# Patient Record
Sex: MALE | Race: WHITE | NOT HISPANIC OR LATINO | Employment: FULL TIME | URBAN - METROPOLITAN AREA
[De-identification: names, ages, dates, MRNs, and addresses within clinical notes are randomized per-mention and may not be internally consistent; named-entity substitution may affect disease eponyms.]

---

## 2020-01-10 ENCOUNTER — OFFICE VISIT (OUTPATIENT)
Dept: FAMILY MEDICINE CLINIC | Facility: CLINIC | Age: 40
End: 2020-01-10

## 2020-01-10 VITALS
BODY MASS INDEX: 37.78 KG/M2 | TEMPERATURE: 98.9 F | WEIGHT: 249.25 LBS | HEART RATE: 70 BPM | OXYGEN SATURATION: 98 % | SYSTOLIC BLOOD PRESSURE: 118 MMHG | HEIGHT: 68 IN | DIASTOLIC BLOOD PRESSURE: 80 MMHG

## 2020-01-10 DIAGNOSIS — K21.9 GASTROESOPHAGEAL REFLUX DISEASE WITHOUT ESOPHAGITIS: Primary | ICD-10-CM

## 2020-01-10 DIAGNOSIS — Z00.00 HEALTHCARE MAINTENANCE: ICD-10-CM

## 2020-01-10 PROCEDURE — 99213 OFFICE O/P EST LOW 20 MIN: CPT | Performed by: FAMILY MEDICINE

## 2020-01-10 RX ORDER — OMEPRAZOLE 20 MG/1
20 CAPSULE, DELAYED RELEASE ORAL DAILY
COMMUNITY
End: 2020-01-10 | Stop reason: SDUPTHER

## 2020-01-10 RX ORDER — OMEPRAZOLE 20 MG/1
20 CAPSULE, DELAYED RELEASE ORAL DAILY
Qty: 90 CAPSULE | Refills: 3 | Status: SHIPPED | OUTPATIENT
Start: 2020-01-10 | End: 2021-02-11 | Stop reason: SDUPTHER

## 2020-01-10 NOTE — PROGRESS NOTES
Subjective   Jose Rich is a 39 y.o. male.     Chief Complaint   Patient presents with   • GI Problem   • Annual Exam       Heartburn   This is a chronic problem. The current episode started more than 1 year ago. The problem occurs constantly. The problem has been unchanged. The symptoms are aggravated by caffeine and certain foods. There are no known risk factors. He has tried a PPI for the symptoms. The treatment provided significant relief.          The following portions of the patient's history were reviewed and updated as appropriate: allergies, current medications, past family history, past medical history, past social history, past surgical history and problem list.    Past Medical History:   Diagnosis Date   • Abdominal pain, LLQ (left lower quadrant)    • Blood in stool    • Change in bowel habits    • Colon cancer screening    • Esophagitis, reflux    • Seasonal allergies        No past surgical history on file.    Family History   Family history unknown: Yes       Social History     Socioeconomic History   • Marital status:      Spouse name: Not on file   • Number of children: Not on file   • Years of education: Not on file   • Highest education level: Not on file   Tobacco Use   • Smoking status: Never Smoker   • Smokeless tobacco: Never Used   Substance and Sexual Activity   • Alcohol use: Never     Frequency: Never   • Drug use: Never   • Sexual activity: Yes       Current Outpatient Medications on File Prior to Visit   Medication Sig Dispense Refill   • [DISCONTINUED] omeprazole (priLOSEC) 20 MG capsule Take 20 mg by mouth Daily.       No current facility-administered medications on file prior to visit.        Review of Systems   All other systems reviewed and are negative.      No results found for this or any previous visit (from the past 4704 hour(s)).  Objective   Vitals:    01/10/20 0733   BP: 118/80   Pulse: 70   Temp: 98.9 °F (37.2 °C)   SpO2: 98%   Weight: 113 kg (249 lb 4 oz)  "  Height: 172.7 cm (68\")     Body mass index is 37.9 kg/m².  Physical Exam   Constitutional: He appears well-developed and well-nourished. No distress.   Cardiovascular: Normal rate and regular rhythm.   Pulmonary/Chest: Effort normal and breath sounds normal. No respiratory distress. He has no wheezes.   Skin: He is not diaphoretic.   Nursing note and vitals reviewed.        Assessment/Plan   Jose was seen today for gi problem and annual exam.    Diagnoses and all orders for this visit:    Gastroesophageal reflux disease without esophagitis  -     omeprazole (priLOSEC) 20 MG capsule; Take 1 capsule by mouth Daily.    Healthcare maintenance  -     Comprehensive Metabolic Panel  -     Lipid Panel  -     CBC & Differential      Return if symptoms worsen or fail to improve.           "

## 2020-01-12 LAB
ALBUMIN SERPL-MCNC: 4.7 G/DL (ref 3.5–5.5)
ALBUMIN/GLOB SERPL: 1.9 {RATIO} (ref 1.2–2.2)
ALP SERPL-CCNC: 67 IU/L (ref 39–117)
ALT SERPL-CCNC: 23 IU/L (ref 0–44)
AST SERPL-CCNC: 19 IU/L (ref 0–40)
BASOPHILS # BLD AUTO: 0 X10E3/UL (ref 0–0.2)
BASOPHILS NFR BLD AUTO: 0 %
BILIRUB SERPL-MCNC: 0.5 MG/DL (ref 0–1.2)
BUN SERPL-MCNC: 17 MG/DL (ref 6–20)
BUN/CREAT SERPL: 17 (ref 9–20)
CALCIUM SERPL-MCNC: 9.4 MG/DL (ref 8.7–10.2)
CHLORIDE SERPL-SCNC: 102 MMOL/L (ref 96–106)
CHOLEST SERPL-MCNC: 188 MG/DL (ref 100–199)
CO2 SERPL-SCNC: 20 MMOL/L (ref 20–29)
CREAT SERPL-MCNC: 1 MG/DL (ref 0.76–1.27)
EOSINOPHIL # BLD AUTO: 0.1 X10E3/UL (ref 0–0.4)
EOSINOPHIL NFR BLD AUTO: 1 %
ERYTHROCYTE [DISTWIDTH] IN BLOOD BY AUTOMATED COUNT: 13.5 % (ref 11.6–15.4)
GLOBULIN SER CALC-MCNC: 2.5 G/DL (ref 1.5–4.5)
GLUCOSE SERPL-MCNC: 96 MG/DL (ref 65–99)
HCT VFR BLD AUTO: 48 % (ref 37.5–51)
HDLC SERPL-MCNC: 45 MG/DL
HGB BLD-MCNC: 16.5 G/DL (ref 13–17.7)
IMM GRANULOCYTES # BLD AUTO: 0 X10E3/UL (ref 0–0.1)
IMM GRANULOCYTES NFR BLD AUTO: 0 %
LDLC SERPL CALC-MCNC: 125 MG/DL (ref 0–99)
LYMPHOCYTES # BLD AUTO: 1.8 X10E3/UL (ref 0.7–3.1)
LYMPHOCYTES NFR BLD AUTO: 25 %
MCH RBC QN AUTO: 30.6 PG (ref 26.6–33)
MCHC RBC AUTO-ENTMCNC: 34.4 G/DL (ref 31.5–35.7)
MCV RBC AUTO: 89 FL (ref 79–97)
MONOCYTES # BLD AUTO: 0.5 X10E3/UL (ref 0.1–0.9)
MONOCYTES NFR BLD AUTO: 7 %
NEUTROPHILS # BLD AUTO: 4.6 X10E3/UL (ref 1.4–7)
NEUTROPHILS NFR BLD AUTO: 67 %
PLATELET # BLD AUTO: 257 X10E3/UL (ref 150–450)
POTASSIUM SERPL-SCNC: 5 MMOL/L (ref 3.5–5.2)
PROT SERPL-MCNC: 7.2 G/DL (ref 6–8.5)
RBC # BLD AUTO: 5.39 X10E6/UL (ref 4.14–5.8)
SODIUM SERPL-SCNC: 140 MMOL/L (ref 134–144)
TRIGL SERPL-MCNC: 89 MG/DL (ref 0–149)
VLDLC SERPL CALC-MCNC: 18 MG/DL (ref 5–40)
WBC # BLD AUTO: 6.9 X10E3/UL (ref 3.4–10.8)

## 2020-12-29 ENCOUNTER — TELEPHONE (OUTPATIENT)
Dept: FAMILY MEDICINE CLINIC | Facility: CLINIC | Age: 40
End: 2020-12-29

## 2020-12-29 NOTE — TELEPHONE ENCOUNTER
PT WIFE CALLED REQUESTING FOR AN EMAIL TO SEND A PHOTO TO REGARDING THE PT.     CALL BACK [phone]

## 2021-01-13 PROBLEM — U07.1 COVID-19 VIRUS INFECTION: Status: ACTIVE | Noted: 2021-01-13

## 2021-02-11 ENCOUNTER — OFFICE VISIT (OUTPATIENT)
Dept: FAMILY MEDICINE CLINIC | Facility: CLINIC | Age: 41
End: 2021-02-11

## 2021-02-11 VITALS
SYSTOLIC BLOOD PRESSURE: 153 MMHG | HEIGHT: 68 IN | WEIGHT: 248.8 LBS | OXYGEN SATURATION: 97 % | HEART RATE: 83 BPM | BODY MASS INDEX: 37.71 KG/M2 | TEMPERATURE: 98 F | DIASTOLIC BLOOD PRESSURE: 80 MMHG

## 2021-02-11 DIAGNOSIS — F33.1 MODERATE EPISODE OF RECURRENT MAJOR DEPRESSIVE DISORDER (HCC): Primary | ICD-10-CM

## 2021-02-11 DIAGNOSIS — K21.9 GASTROESOPHAGEAL REFLUX DISEASE WITHOUT ESOPHAGITIS: ICD-10-CM

## 2021-02-11 DIAGNOSIS — U09.9 POST-COVID SYNDROME: ICD-10-CM

## 2021-02-11 PROCEDURE — 99214 OFFICE O/P EST MOD 30 MIN: CPT | Performed by: FAMILY MEDICINE

## 2021-02-11 RX ORDER — OMEPRAZOLE 20 MG/1
20 CAPSULE, DELAYED RELEASE ORAL DAILY
Qty: 90 CAPSULE | Refills: 3 | Status: SHIPPED | OUTPATIENT
Start: 2021-02-11 | End: 2021-12-29

## 2021-02-11 RX ORDER — BUPROPION HYDROCHLORIDE 150 MG/1
150 TABLET ORAL DAILY
Qty: 30 TABLET | Refills: 11 | Status: SHIPPED | OUTPATIENT
Start: 2021-02-11 | End: 2021-03-09 | Stop reason: SDUPTHER

## 2021-02-11 NOTE — PROGRESS NOTES
"Subjective   Jose Rich is a 40 y.o. male.     Chief Complaint   Patient presents with   • Heartburn   • Depression       History of Present Illness     Follow-up on GERD.  Complains of depressed mood.  Status post Covid recovered.    The following portions of the patient's history were reviewed and updated as appropriate: allergies, current medications, past family history, past medical history, past social history, past surgical history and problem list.    Past Medical History:   Diagnosis Date   • Abdominal pain, LLQ (left lower quadrant)    • Blood in stool    • Change in bowel habits    • Colon cancer screening    • Esophagitis, reflux    • Seasonal allergies        History reviewed. No pertinent surgical history.    Family History   Family history unknown: Yes       Social History     Socioeconomic History   • Marital status:      Spouse name: Not on file   • Number of children: Not on file   • Years of education: Not on file   • Highest education level: Not on file   Tobacco Use   • Smoking status: Never Smoker   • Smokeless tobacco: Never Used   Substance and Sexual Activity   • Alcohol use: Never     Frequency: Never   • Drug use: Never   • Sexual activity: Yes       Current Outpatient Medications on File Prior to Visit   Medication Sig Dispense Refill   • [DISCONTINUED] omeprazole (priLOSEC) 20 MG capsule Take 1 capsule by mouth Daily. 90 capsule 3     No current facility-administered medications on file prior to visit.        Review of Systems    No results found for this or any previous visit (from the past 4704 hour(s)).  Objective   Vitals:    02/11/21 1047   BP: 153/80   BP Location: Left arm   Patient Position: Sitting   Pulse: 83   Temp: 98 °F (36.7 °C)   SpO2: 97%   Weight: 113 kg (248 lb 12.8 oz)   Height: 172.7 cm (67.99\")     Body mass index is 37.84 kg/m².  Physical Exam  Vitals signs and nursing note reviewed.   Constitutional:       General: He is not in acute distress.     " Appearance: He is well-developed. He is not diaphoretic.   Cardiovascular:      Rate and Rhythm: Normal rate and regular rhythm.   Pulmonary:      Effort: Pulmonary effort is normal. No respiratory distress.      Breath sounds: Normal breath sounds. No wheezing.   Psychiatric:      Comments: Depressed flat affect           Diagnoses and all orders for this visit:    1. Moderate episode of recurrent major depressive disorder (CMS/HCC) (Primary)  Comments:  New diagnosis.  We will start Wellbutrin.  Follow-up 4 weeks  Medication side effects and education given  Orders:  -     buPROPion XL (Wellbutrin XL) 150 MG 24 hr tablet; Take 1 tablet by mouth Daily.  Dispense: 30 tablet; Refill: 11    2. Gastroesophageal reflux disease without esophagitis  Comments:  Stable on current medications.  Refills  Orders:  -     omeprazole (priLOSEC) 20 MG capsule; Take 1 capsule by mouth Daily.  Dispense: 90 capsule; Refill: 3    3. Post-COVID syndrome          Return in about 2 months (around 4/11/2021) for b/p, depression.

## 2021-03-09 DIAGNOSIS — F33.1 MODERATE EPISODE OF RECURRENT MAJOR DEPRESSIVE DISORDER (HCC): ICD-10-CM

## 2021-03-09 RX ORDER — BUPROPION HYDROCHLORIDE 150 MG/1
150 TABLET ORAL DAILY
Qty: 90 TABLET | Refills: 3 | Status: SHIPPED | OUTPATIENT
Start: 2021-03-09 | End: 2022-07-28

## 2021-03-25 ENCOUNTER — TELEPHONE (OUTPATIENT)
Dept: FAMILY MEDICINE CLINIC | Facility: CLINIC | Age: 41
End: 2021-03-25

## 2021-03-25 NOTE — TELEPHONE ENCOUNTER
Called patient and urged him to go to ER.    Patient stated he only had a few hours of work and wanted to finish then stop by on the way home. I explained that if they are continuing to get worse he would need to go to ER sooner. Patient verbalized understanding

## 2021-03-25 NOTE — TELEPHONE ENCOUNTER
Caller:     Relationship to patient: MAYA Manzano call back number: [phone]    Chief complaint: CHEST PAIN AND IT'S GETTING WORSE    Patient directed to call 911 or go to their nearest emergency room.     Patient verbalized understanding: [x] Yes  [] No

## 2021-04-16 ENCOUNTER — BULK ORDERING (OUTPATIENT)
Dept: CASE MANAGEMENT | Facility: OTHER | Age: 41
End: 2021-04-16

## 2021-04-16 DIAGNOSIS — Z23 IMMUNIZATION DUE: ICD-10-CM

## 2021-12-29 DIAGNOSIS — K21.9 GASTROESOPHAGEAL REFLUX DISEASE WITHOUT ESOPHAGITIS: ICD-10-CM

## 2021-12-29 RX ORDER — OMEPRAZOLE 20 MG/1
CAPSULE, DELAYED RELEASE ORAL
Qty: 90 CAPSULE | Refills: 0 | Status: SHIPPED | OUTPATIENT
Start: 2021-12-29

## 2022-04-13 DIAGNOSIS — F33.1 MODERATE EPISODE OF RECURRENT MAJOR DEPRESSIVE DISORDER: ICD-10-CM

## 2022-04-13 RX ORDER — BUPROPION HYDROCHLORIDE 150 MG/1
TABLET ORAL
Qty: 90 TABLET | Refills: 3 | OUTPATIENT
Start: 2022-04-13

## 2022-07-26 DIAGNOSIS — F33.1 MODERATE EPISODE OF RECURRENT MAJOR DEPRESSIVE DISORDER: ICD-10-CM

## 2022-07-28 RX ORDER — BUPROPION HYDROCHLORIDE 150 MG/1
TABLET ORAL
Qty: 90 TABLET | Refills: 3 | Status: SHIPPED | OUTPATIENT
Start: 2022-07-28

## 2022-09-07 ENCOUNTER — TELEPHONE (OUTPATIENT)
Dept: FAMILY MEDICINE CLINIC | Facility: CLINIC | Age: 42
End: 2022-09-07

## 2022-09-07 NOTE — TELEPHONE ENCOUNTER
DELETE AFTER REVIEWING: Telephone encounter to be sent to the clinical pool     Caller: ELLIOTT GARCIA    Relationship to patient: Emergency Contact    Best call back number: [phone]     Chief complaint: PATIENTS WIFE CALLED IN STATING THAT HE HAD A HEMORRHOID AND IS CURRENTLY BLEEDING OUT. HAD ADVISE TO TAKE HIM TO THE EMERGENCY ROOM     Patient directed to call 911 or go to their nearest emergency room.     Patient verbalized understanding: [x] Yes  [] No

## 2022-09-08 ENCOUNTER — OFFICE VISIT (OUTPATIENT)
Dept: SURGERY | Facility: CLINIC | Age: 42
End: 2022-09-08

## 2022-09-08 VITALS — BODY MASS INDEX: 35.46 KG/M2 | HEIGHT: 68 IN | WEIGHT: 234 LBS

## 2022-09-08 DIAGNOSIS — K64.5 THROMBOSED EXTERNAL HEMORRHOID: Primary | ICD-10-CM

## 2022-09-08 PROCEDURE — 46083 INC THROMBOSED HROID XTRNL: CPT | Performed by: SURGERY

## 2022-09-08 NOTE — PROGRESS NOTES
General Surgery  Initial Office Visit    CC: Thrombosed external hemorrhoid    HPI: The patient is a pleasant 41 y.o. year-old gentleman who presents today for evaluation of a thrombosed external hemorrhoid which has been present in a constant duration for about 6 days.  He noticed it last Saturday when he was inpatient forward with his family.  He denies any associated constipation before the onset of the hemorrhoid.  He has had previous external hemorrhoids that resolved on their own, and attributes each previous hemorrhoidal episode with attempts at weight loss.  He says the hemorrhoid started on Saturday while he was with his family in [place] and got worse the next day while he was riding a roller coaster.  Then 2 days ago he noticed some rectal bleeding and suspected this was another recurrent thrombosed hemorrhoid.  He has had significant anal pain that has been no better even after visiting [facility] emergency room a couple days ago.    Past Medical History:   GERD  Depression    Past Surgical History:   Colonoscopy (2017, Dr. Lester at [facility] - thrombosed external hemorrhoids)    Medications:   Wellbutrin  mg daily  Omeprazole 20 mg daily    Allergies: No known drug allergies    Family History: Mother with history of asthma and hypertension, maternal grandfather with history of colon cancer    Social History: , smokes a vape pen, no regular alcohol use, no illicit drug use    ROS:   Constitutional: Negative for fevers or chills  HENT: Negative for hearing loss or runny nose  Eyes: Negative for vision changes or scleral icterus  Respiratory: Negative for cough or shortness of breath  Cardiovascular: Negative for chest pain or heart palpitations  Gastrointestinal: Positive for rectal pain and anal bleeding; negative for abdominal distension, nausea, vomiting, constipation, or melena  Genitourinary: Negative for hematuria or dysuria  Musculoskeletal: Negative for joint  swelling or gait instability  Neurologic: Negative for tremors or seizures  Psychiatric: Negative for suicidal ideations or agitation  All other systems reviewed and negative    Physical Exam:  Height: 172 cm  Weight: 106 kg  BMI: 35.5  General: No acute distress, well-nourished & well-developed  HEAD: normocephalic, atraumatic  EYES: normal conjunctiva, sclera anicteric  EARS: grossly normal hearing  NECK: supple, no thyromegaly  CARDIOVASCULAR: regular rate and rhythm  RESPIRATORY: clear to auscultation bilaterally  GASTROINTESTINAL: soft, nontender, non-distended  MUSCULOSKELETAL: normal gait and station. No gross extremity abnormalities  PSYCHIATRIC: oriented x3, normal mood and affect  RECTUM: Thrombosed hemorrhoid along left anal sidewall with exposed clot that is nearly fully evacuated    Procedure Note:  Pre-Operative Diagnosis: Thrombosed external hemorrhoid  Post-Operative Diagnosis: Same  Procedure performed: Incision and drainage thrombosed external hemorrhoid  Surgeon: Darline Echols MD  Assistant: None  Anesthesia: Local (1% Lidocaine with epinephrine)  Complications: None  EBL: Minimal  Specimens: None  Description of Procedure: The patient was brought to the minor procedure room and laid in the left lateral decubitus position.  His perianal skin was prepped and draped in a sterile fashion and a surgical timeout completed.  The skin overlying the thrombosed hemorrhoid was anesthetized using 1% lidocaine with epinephrine and incised sharply, manually evacuating the internal clot with palpation.  A folded up piece of gauze was placed within his gluteal cleft.  He tolerated the procedure well.    ASSESSMENT & PLAN  Mr. Rich is a 41-year-old gentleman with a thrombosed external hemorrhoid that I performed an incision and drainage on today.  He should avoid constipation with the use of stool softeners and/or laxatives.  He should expect some rectal bleeding from the thrombosed hemorrhoid incision  site for 1 to 2 days.  He can follow-up with me on an as-needed basis.    Darline Echols MD  General, Robotic, and Endoscopic Surgery  [facility]    [address], [address]  [place] [address]  P: [phone]  F: [phone]

## 2023-05-05 ENCOUNTER — OFFICE VISIT (OUTPATIENT)
Dept: SURGERY | Facility: CLINIC | Age: 43
End: 2023-05-05
Payer: COMMERCIAL

## 2023-05-05 VITALS
BODY MASS INDEX: 37.71 KG/M2 | WEIGHT: 248.8 LBS | HEIGHT: 68 IN | DIASTOLIC BLOOD PRESSURE: 86 MMHG | SYSTOLIC BLOOD PRESSURE: 130 MMHG

## 2023-05-05 DIAGNOSIS — K92.1 MELENA: ICD-10-CM

## 2023-05-05 DIAGNOSIS — R10.13 EPIGASTRIC ABDOMINAL PAIN: ICD-10-CM

## 2023-05-05 DIAGNOSIS — K21.9 GASTROESOPHAGEAL REFLUX DISEASE, UNSPECIFIED WHETHER ESOPHAGITIS PRESENT: Primary | ICD-10-CM

## 2023-05-05 RX ORDER — PANTOPRAZOLE SODIUM 40 MG/1
40 TABLET, DELAYED RELEASE ORAL DAILY
Qty: 90 TABLET | Refills: 3 | Status: SHIPPED | OUTPATIENT
Start: 2023-05-05 | End: 2024-05-04

## 2023-05-05 NOTE — H&P (VIEW-ONLY)
General Surgery  Initial Office Visit    CC: Abdominal pain    HPI: The patient is a pleasant 42 y.o. year-old gentleman who presents today for evaluation of burning epigastric abdominal pain that started early last week when he returned from vacation.  While on vacation in [place] he had been drinking more alcohol and eating more fatty foods than he usually does.  Upon returning home the burning abdominal pain began, has been constant, and was progressively worsening until he started taking omeprazole again.  He also noticed his stool turned black, but when I reviewed the pictures it appears as though the stool was a very dark green and was not the tarry/sticky consistency that would be expected for melena.  The stool color change quickly resolved overnight and his stools have now gone back to being soft and brown.  His abdominal pain has improved with use of omeprazole but has not fully resolved.  He also mentions to me sporadic left lower quadrant twinges of pain that he has experienced off-and-on for the last few years that feels unrelated to this recent issue.  While he has been experiencing the pain and melena in the last 2 weeks, he noticed intense burning of the esophagus when he has ingested carbonated beverages such as Diet Coke, so he has switched to water and tea with noticeable improvement.  He remembers having an EGD at least once in the past and thinks it was done back in either 2012 or 2013 at the time of a previous colonoscopy.  His last colonoscopy was done in 2017 by Dr. Lester with no accompanying upper endoscopy at that time.  There was no significant pathology found on that colonoscopy.  He has been on regular PPI therapy for the last 15 years, starting first with Nexium and eventually switching over to omeprazole a few years ago.    Past Medical History:   GERD    Past Surgical History:   Colonoscopy (2018, reportedly normal)    Medications:   Omeprazole 20 mg daily    Allergies: No known  drug allergies    Family History: Mother with history of asthma, diabetes, and hypertension; maternal grandfather with history of colon cancer    Social History: , former cigarette smoker, current vape smoker, no regular alcohol use, works second shift for an [employer] company    ROS:   Constitutional: Negative for fevers or chills  HENT: Negative for hearing loss or runny nose  Eyes: Negative for vision changes or scleral icterus  Respiratory: Negative for cough or shortness of breath  Cardiovascular: Negative for chest pain or heart palpitations  Gastrointestinal: Positive for abdominal pain and melena; negative for abdominal distension, nausea, vomiting, constipation, melena, or hematochezia  Genitourinary: Negative for hematuria or dysuria  Musculoskeletal: Negative for joint swelling or gait instability  Neurologic: Negative for tremors or seizures  Psychiatric: Negative for suicidal ideations or agitation  All other systems reviewed and negative    Physical Exam:  Height: 172 cm  Weight: 113 kg  BMI: 37.8  General: No acute distress, well-nourished & well-developed  HEAD: normocephalic, atraumatic  EYES: normal conjunctiva, sclera anicteric  EARS: grossly normal hearing  NECK: supple, no thyromegaly  CARDIOVASCULAR: regular rate and rhythm  RESPIRATORY: clear to auscultation bilaterally  GASTROINTESTINAL: soft, nontender, non-distended  MUSCULOSKELETAL: normal gait and station. No gross extremity abnormalities  PSYCHIATRIC: oriented x3, normal mood and affect    IMAGING:  None    ASSESSMENT & PLAN  Mr. Rich is a 42-year-old gentleman with chronic GERD/heartburn and acute epigastric abdominal pain with melena concerning for peptic ulcer disease.  I have switched him to 40 mg of Protonix daily and advised him to stop taking the omeprazole.  I would also recommend we schedule him for EGD as soon as possible.  I am unfortunately out of town all of next week but will be happy to do it the  Monday I am back on May 15th.  I discussed with him the risks of upper endoscopy which include bleeding, gastrointestinal perforation, and missed pathology.  Despite these risks, he has consented to proceed.    Dalrine Echols MD  General, Robotic, and Endoscopic Surgery  [facility]    [address], [address]  [place] [address]  P: [phone]  F: [phone]

## 2023-05-05 NOTE — PROGRESS NOTES
General Surgery  Initial Office Visit    CC: Abdominal pain    HPI: The patient is a pleasant 42 y.o. year-old gentleman who presents today for evaluation of burning epigastric abdominal pain that started early last week when he returned from vacation.  While on vacation in [place] he had been drinking more alcohol and eating more fatty foods than he usually does.  Upon returning home the burning abdominal pain began, has been constant, and was progressively worsening until he started taking omeprazole again.  He also noticed his stool turned black, but when I reviewed the pictures it appears as though the stool was a very dark green and was not the tarry/sticky consistency that would be expected for melena.  The stool color change quickly resolved overnight and his stools have now gone back to being soft and brown.  His abdominal pain has improved with use of omeprazole but has not fully resolved.  He also mentions to me sporadic left lower quadrant twinges of pain that he has experienced off-and-on for the last few years that feels unrelated to this recent issue.  While he has been experiencing the pain and melena in the last 2 weeks, he noticed intense burning of the esophagus when he has ingested carbonated beverages such as Diet Coke, so he has switched to water and tea with noticeable improvement.  He remembers having an EGD at least once in the past and thinks it was done back in either 2012 or 2013 at the time of a previous colonoscopy.  His last colonoscopy was done in 2017 by Dr. Lester with no accompanying upper endoscopy at that time.  There was no significant pathology found on that colonoscopy.  He has been on regular PPI therapy for the last 15 years, starting first with Nexium and eventually switching over to omeprazole a few years ago.    Past Medical History:   GERD    Past Surgical History:   Colonoscopy (2018, reportedly normal)    Medications:   Omeprazole 20 mg daily    Allergies: No known  drug allergies    Family History: Mother with history of asthma, diabetes, and hypertension; maternal grandfather with history of colon cancer    Social History: , former cigarette smoker, current vape smoker, no regular alcohol use, works second shift for an [employer] company    ROS:   Constitutional: Negative for fevers or chills  HENT: Negative for hearing loss or runny nose  Eyes: Negative for vision changes or scleral icterus  Respiratory: Negative for cough or shortness of breath  Cardiovascular: Negative for chest pain or heart palpitations  Gastrointestinal: Positive for abdominal pain and melena; negative for abdominal distension, nausea, vomiting, constipation, melena, or hematochezia  Genitourinary: Negative for hematuria or dysuria  Musculoskeletal: Negative for joint swelling or gait instability  Neurologic: Negative for tremors or seizures  Psychiatric: Negative for suicidal ideations or agitation  All other systems reviewed and negative    Physical Exam:  Height: 172 cm  Weight: 113 kg  BMI: 37.8  General: No acute distress, well-nourished & well-developed  HEAD: normocephalic, atraumatic  EYES: normal conjunctiva, sclera anicteric  EARS: grossly normal hearing  NECK: supple, no thyromegaly  CARDIOVASCULAR: regular rate and rhythm  RESPIRATORY: clear to auscultation bilaterally  GASTROINTESTINAL: soft, nontender, non-distended  MUSCULOSKELETAL: normal gait and station. No gross extremity abnormalities  PSYCHIATRIC: oriented x3, normal mood and affect    IMAGING:  None    ASSESSMENT & PLAN  Mr. Rich is a 42-year-old gentleman with chronic GERD/heartburn and acute epigastric abdominal pain with melena concerning for peptic ulcer disease.  I have switched him to 40 mg of Protonix daily and advised him to stop taking the omeprazole.  I would also recommend we schedule him for EGD as soon as possible.  I am unfortunately out of town all of next week but will be happy to do it the  Monday I am back on May 15th.  I discussed with him the risks of upper endoscopy which include bleeding, gastrointestinal perforation, and missed pathology.  Despite these risks, he has consented to proceed.    Darline Echols MD  General, Robotic, and Endoscopic Surgery  [facility]    [address], [address]  [place] [address]  P: [phone]  F: [phone]

## 2023-05-08 PROBLEM — K92.1 MELENA: Status: ACTIVE | Noted: 2023-05-08

## 2023-05-08 PROBLEM — R10.13 EPIGASTRIC ABDOMINAL PAIN: Status: ACTIVE | Noted: 2023-05-08

## 2023-05-22 ENCOUNTER — ANESTHESIA EVENT (OUTPATIENT)
Dept: GASTROENTEROLOGY | Facility: HOSPITAL | Age: 43
End: 2023-05-22
Payer: COMMERCIAL

## 2023-05-22 ENCOUNTER — HOSPITAL ENCOUNTER (OUTPATIENT)
Facility: HOSPITAL | Age: 43
Setting detail: HOSPITAL OUTPATIENT SURGERY
Discharge: HOME | End: 2023-05-22
Attending: SURGERY | Admitting: SURGERY
Payer: COMMERCIAL

## 2023-05-22 ENCOUNTER — ANESTHESIA (OUTPATIENT)
Dept: GASTROENTEROLOGY | Facility: HOSPITAL | Age: 43
End: 2023-05-22
Payer: COMMERCIAL

## 2023-05-22 VITALS
SYSTOLIC BLOOD PRESSURE: 119 MMHG | BODY MASS INDEX: 31.83 KG/M2 | OXYGEN SATURATION: 98 % | HEIGHT: 68 IN | WEIGHT: 210 LBS | RESPIRATION RATE: 18 BRPM | DIASTOLIC BLOOD PRESSURE: 74 MMHG | HEART RATE: 63 BPM

## 2023-05-22 DIAGNOSIS — K92.1 MELENA: ICD-10-CM

## 2023-05-22 DIAGNOSIS — K21.9 GASTROESOPHAGEAL REFLUX DISEASE, UNSPECIFIED WHETHER ESOPHAGITIS PRESENT: ICD-10-CM

## 2023-05-22 DIAGNOSIS — R10.13 EPIGASTRIC ABDOMINAL PAIN: ICD-10-CM

## 2023-05-22 PROBLEM — K31.7 GASTRIC POLYPS: Status: ACTIVE | Noted: 2023-05-22

## 2023-05-22 PROCEDURE — 25010000002 PROPOFOL 10 MG/ML EMULSION: Performed by: NURSE ANESTHETIST, CERTIFIED REGISTERED

## 2023-05-22 PROCEDURE — 88305 TISSUE EXAM BY PATHOLOGIST: CPT | Performed by: SURGERY

## 2023-05-22 RX ORDER — SODIUM CHLORIDE, SODIUM LACTATE, POTASSIUM CHLORIDE, CALCIUM CHLORIDE 600; 310; 30; 20 MG/100ML; MG/100ML; MG/100ML; MG/100ML
1000 INJECTION, SOLUTION INTRAVENOUS CONTINUOUS
Status: DISCONTINUED | OUTPATIENT
Start: 2023-05-22 | End: 2023-05-22 | Stop reason: HOSPADM

## 2023-05-22 RX ORDER — PROPOFOL 10 MG/ML
VIAL (ML) INTRAVENOUS AS NEEDED
Status: DISCONTINUED | OUTPATIENT
Start: 2023-05-22 | End: 2023-05-22 | Stop reason: SURG

## 2023-05-22 RX ORDER — SODIUM CHLORIDE 0.9 % (FLUSH) 0.9 %
10 SYRINGE (ML) INJECTION AS NEEDED
Status: DISCONTINUED | OUTPATIENT
Start: 2023-05-22 | End: 2023-05-22 | Stop reason: HOSPADM

## 2023-05-22 RX ORDER — GLYCOPYRROLATE 0.2 MG/ML
INJECTION INTRAMUSCULAR; INTRAVENOUS AS NEEDED
Status: DISCONTINUED | OUTPATIENT
Start: 2023-05-22 | End: 2023-05-22 | Stop reason: SURG

## 2023-05-22 RX ORDER — ONDANSETRON 2 MG/ML
4 INJECTION INTRAMUSCULAR; INTRAVENOUS ONCE AS NEEDED
Status: DISCONTINUED | OUTPATIENT
Start: 2023-05-22 | End: 2023-05-22 | Stop reason: HOSPADM

## 2023-05-22 RX ORDER — LIDOCAINE HYDROCHLORIDE 20 MG/ML
INJECTION, SOLUTION INFILTRATION; PERINEURAL AS NEEDED
Status: DISCONTINUED | OUTPATIENT
Start: 2023-05-22 | End: 2023-05-22 | Stop reason: SURG

## 2023-05-22 RX ORDER — MULTIPLE VITAMINS W/ MINERALS TAB 9MG-400MCG
1 TAB ORAL DAILY
COMMUNITY

## 2023-05-22 RX ADMIN — GLYCOPYRROLATE 0.2 MG: 0.2 INJECTION INTRAMUSCULAR; INTRAVENOUS at 14:33

## 2023-05-22 RX ADMIN — PROPOFOL 100 MG: 10 INJECTION, EMULSION INTRAVENOUS at 14:41

## 2023-05-22 RX ADMIN — LIDOCAINE HYDROCHLORIDE 50 MG: 20 INJECTION, SOLUTION INFILTRATION; PERINEURAL at 14:30

## 2023-05-22 RX ADMIN — PROPOFOL 100 MG: 10 INJECTION, EMULSION INTRAVENOUS at 14:31

## 2023-05-22 RX ADMIN — SODIUM CHLORIDE, POTASSIUM CHLORIDE, SODIUM LACTATE AND CALCIUM CHLORIDE 1000 ML: 600; 310; 30; 20 INJECTION, SOLUTION INTRAVENOUS at 13:38

## 2023-05-22 RX ADMIN — PROPOFOL 100 MG: 10 INJECTION, EMULSION INTRAVENOUS at 14:51

## 2023-05-22 RX ADMIN — PROPOFOL 100 MG: 10 INJECTION, EMULSION INTRAVENOUS at 14:37

## 2023-05-22 NOTE — OP NOTE
Operative Note :  Darline Echols MD      Jose Rich  1980    Procedure Date: 05/22/23    Pre-op Diagnosis:  Gastroesophageal reflux disease, unspecified whether esophagitis present [K21.9]  Melena [K92.1]  Epigastric abdominal pain [R10.13]    Post-Operative Diagnosis:  Gastric polyps    Procedure:   Esophagogastroduodenoscopy with hot snare polypectomy x9 and biopsy    Surgeon: Darline Echols MD    Assistant: None    Anesthesia:  MAC (monitored anesthetic care)    Estimated Blood Loss: Minimal    Specimens:   Duodenal biopsy  Antral biopsy  GE junction biopsy  Gastric polyps x9    Complications: None    Indications:  Mr. Rich is a 42-year-old gentleman who recently experienced epigastric abdominal pain coupled with a dark tarry consistency of his stools.  I recommended proceeding with EGD today to assess for peptic ulcer disease.  He understands the risks of the procedure include bleeding, gastrointestinal perforation, and possible missed pathology.  Despite these risks, he has consented to proceed.    Findings: Multiple gastric polyps, 1 of which appeared friable as though it had recently been bleeding    Description of procedure:  The patient was brought to the endoscopy suite and kang in the left lateral decubitus position.  A bite-block was inserted into the oropharynx.  Continuous propofol anesthesia was administered.  A surgical timeout was completed.  An upper endoscope was passed through the bite-block to the posterior oropharynx where the vocal cords and epiglottis were grossly normal.  The cervical esophagus was cannulated and the scope passed onward through the full length of the esophagus noting no evidence of esophagitis or hiatal hernia.  The GE junction and Z-line appeared grossly unremarkable and were traversed entering the body of the stomach which was maximally insufflated.  There were multiple benign-appearing gastric polyps, 9 of which were removed using the hot snare.  They  varied in size between 2 mm and 8 mm.  One of the polyps had somewhat friable appearing mucosa suggesting recent bleeding.  The remainder of the gastric mucosa was largely unremarkable and there was no evidence for gastric malignancy.  The pylorus was traversed entering the duodenal bulb which appeared unremarkable.  The scope was passed into the distal third portion of the duodenum and slowly retracted, noting no mucosal abnormalities.  The duodenal mucosa was biopsied to test for celiac disease as was the antral biopsy to test for H. pylori.  The GE junction was biopsied to test for Mejia's esophagus.  The scope was then retracted out of the mouth noting no further upper gastrointestinal abnormalities.  He transferred to the recovery room in stable condition having tolerated the procedure well.    Darline Echols MD  General, Robotic, and Endoscopic Surgery  [facility] [place]    [address], [address]  [place] [address]  P: [phone]  F: [phone]

## 2023-05-22 NOTE — ANESTHESIA PREPROCEDURE EVALUATION
Anesthesia Evaluation                  Airway   Mallampati: III  TM distance: <3 FB  Neck ROM: limited  Small opening and Possible difficult intubation  Dental      Pulmonary    (+) a smoker Former,   Cardiovascular     Rhythm: regular  Rate: normal        Neuro/Psych  (+) psychiatric history Anxiety and Depression,    GI/Hepatic/Renal/Endo    (+) morbid obesity, GERD, GI bleeding ,     Musculoskeletal     Abdominal    Substance History      OB/GYN          Other                        Anesthesia Plan    ASA 3     MAC     (I have reviewed the patient's history with the patient and the chart, including all pertinent laboratory results and imaging. I have explained the risks of anesthesia including but not limited to dental damage, corneal abrasion, nerve injury, MI, stroke, and death. Questions asked and answered. Anesthetic plan discussed with patient and team as indicated. Patient expressed understanding of the above.  )  intravenous induction     Anesthetic plan, risks, benefits, and alternatives have been provided, discussed and informed consent has been obtained with: patient.    Plan discussed with CRNA.        CODE STATUS:

## 2023-05-22 NOTE — DISCHARGE INSTRUCTIONS
For the next 24 hours patient needs to be with a responsible adult.    For 24 hours DO NOT drive, operate machinery, appliances, drink alcohol, make important decisions or sign legal documents.    Start with a light or bland diet if you are feeling sick to your stomach otherwise advance to regular diet as tolerated.    Follow recommendations on procedure report if provided by your doctor.    Call Dr Echols for problems [phone]    Problems may include but not limited to: large amounts of bleeding, trouble breathing, repeated vomiting, severe unrelieved pain, fever or chills.

## 2023-05-23 ENCOUNTER — TELEPHONE (OUTPATIENT)
Dept: SURGERY | Facility: CLINIC | Age: 43
End: 2023-05-23
Payer: COMMERCIAL

## 2023-05-23 LAB
LAB AP CASE REPORT: NORMAL
PATH REPORT.FINAL DX SPEC: NORMAL
PATH REPORT.GROSS SPEC: NORMAL

## 2023-05-23 NOTE — TELEPHONE ENCOUNTER
I called Jose and discussed his benign pathology findings from EGD yesterday.  Specifically there was no evidence for celiac disease, H. pylori, or Mejia's esophagus.  He had 9 gastric polyps that all returned as benign fundic gland polyps.  These require no long-term intervention.  He expressed understanding.  I encouraged him to continue taking a daily antacid to control his long-term acid reflux.

## 2023-05-23 NOTE — ANESTHESIA POSTPROCEDURE EVALUATION
Patient: Jose Rich    Procedure Summary     Date: 05/22/23 Room / Location:  [facility] /  [facility]    Anesthesia Start: 1426 Anesthesia Stop: 1500    Procedure: ESOPHAGOGASTRODUODENOSCOPY with biopsy and hot polypectomies (Esophagus) Diagnosis:       Gastroesophageal reflux disease, unspecified whether esophagitis present      Melena      Epigastric abdominal pain      (Gastroesophageal reflux disease, unspecified whether esophagitis present [K21.9])      (Melena [K92.1])      (Epigastric abdominal pain [R10.13])    Surgeons: Darline Echols MD Provider: Geetha Wilkins MD    Anesthesia Type: MAC ASA Status: 3          Anesthesia Type: MAC    Vitals  Vitals Value Taken Time   /74 05/22/23 1522   Temp     Pulse 63 05/22/23 1522   Resp 18 05/22/23 1522   SpO2 98 % 05/22/23 1522           Post Anesthesia Care and Evaluation      Comments: Patient was discharged from the PACU without being evaluated by anesthesia.  No apparent anesthetic complications were reported. Non-medically directed

## 2023-05-24 NOTE — ANESTHESIA POSTPROCEDURE EVALUATION
Patient: Jose Rich    Procedure Summary     Date: 05/22/23 Room / Location: [facility] / [facility]    Anesthesia Start: 1426 Anesthesia Stop: 1500    Procedure: ESOPHAGOGASTRODUODENOSCOPY with biopsy and hot polypectomies (Esophagus) Diagnosis:       Gastroesophageal reflux disease, unspecified whether esophagitis present      Melena      Epigastric abdominal pain      (Gastroesophageal reflux disease, unspecified whether esophagitis present [K21.9])      (Melena [K92.1])      (Epigastric abdominal pain [R10.13])    Surgeons: Darline Echols MD Provider: Jose Sanchez MD    Anesthesia Type: MAC ASA Status: 3          Anesthesia Type: MAC    Vitals  Vitals Value Taken Time   /74 05/22/23 1522   Temp     Pulse 63 05/22/23 1522   Resp 18 05/22/23 1522   SpO2 98 % 05/22/23 1522           Post Anesthesia Care and Evaluation    Patient location during evaluation: PACU  Patient participation: complete - patient participated  Level of consciousness: awake and alert  Pain management: adequate    Airway patency: patent  Anesthetic complications: No anesthetic complications    Cardiovascular status: acceptable  Respiratory status: acceptable  Hydration status: acceptable    Comments: --------------------            05/22/23               1522     --------------------   BP:       119/74     Pulse:      63       Resp:       18       SpO2:      98%      --------------------

## 2024-04-24 RX ORDER — PANTOPRAZOLE SODIUM 40 MG/1
40 TABLET, DELAYED RELEASE ORAL DAILY
Qty: 90 TABLET | Refills: 3 | Status: SHIPPED | OUTPATIENT
Start: 2024-04-24

## 2025-05-06 NOTE — TELEPHONE ENCOUNTER
Called pt yesterday and today to see if he wanted a refill of pantoprazole. Pt did not answer, left message to call back

## 2025-05-07 NOTE — TELEPHONE ENCOUNTER
Called pt again and left a message asking him to to call our office if he wanted a refill of protonix

## 2025-05-09 ENCOUNTER — TELEPHONE (OUTPATIENT)
Dept: SURGERY | Facility: CLINIC | Age: 45
End: 2025-05-09
Payer: COMMERCIAL

## 2025-05-09 NOTE — TELEPHONE ENCOUNTER
Patient spouse called in to request refill for Protonix. Spoke to MA and confirmed medication request. Updated patient phone number as we only had the spouse number listed.   Advised patient message will be sent to provider for approval and confirmed pharmacy on file is correct.

## 2025-05-11 RX ORDER — PANTOPRAZOLE SODIUM 40 MG/1
40 TABLET, DELAYED RELEASE ORAL DAILY
Qty: 90 TABLET | Refills: 3 | Status: SHIPPED | OUTPATIENT
Start: 2025-05-11

## (undated) DEVICE — LN SMPL CO2 SHTRM SD STREAM W/M LUER

## (undated) DEVICE — SENSR O2 OXIMAX FNGR A/ 18IN NONSTR

## (undated) DEVICE — ERBE NESSY®PLATE 170 SPLIT; 168CM²; CABLE 3M: Brand: ERBE

## (undated) DEVICE — MSK ENDO PORT O2 POM ELITE CURAPLEX A/

## (undated) DEVICE — ADAPT CLN BIOGUARD AIR/H2O DISP

## (undated) DEVICE — CANN O2 ETCO2 FITS ALL CONN CO2 SMPL A/ 7IN DISP LF

## (undated) DEVICE — FRCP BX RADJAW4 NDL 2.8 240CM LG OG BX40

## (undated) DEVICE — TUBING, SUCTION, 1/4" X 10', STRAIGHT: Brand: MEDLINE

## (undated) DEVICE — BITEBLOCK OMNI BLOC

## (undated) DEVICE — KT ORCA ORCAPOD DISP STRL

## (undated) DEVICE — NET RETRV ROTHNET SELCT 2.5MM 3X6X230CM NS

## (undated) DEVICE — GOWN,SIRUS,NON REINFRCD,LARGE,SET IN SL: Brand: MEDLINE

## (undated) DEVICE — THE SINGLE USE ETRAP – POLYP TRAP IS USED FOR SUCTION RETRIEVAL OF ENDOSCOPICALLY REMOVED POLYPS.: Brand: ETRAP

## (undated) DEVICE — SNAR POLYP SENSATION STDOVL 27 240 BX40